# Patient Record
Sex: MALE | Race: BLACK OR AFRICAN AMERICAN | NOT HISPANIC OR LATINO | URBAN - METROPOLITAN AREA
[De-identification: names, ages, dates, MRNs, and addresses within clinical notes are randomized per-mention and may not be internally consistent; named-entity substitution may affect disease eponyms.]

---

## 2022-11-23 ENCOUNTER — EMERGENCY (EMERGENCY)
Facility: HOSPITAL | Age: 37
LOS: 1 days | Discharge: HOME | End: 2022-11-23
Admitting: EMERGENCY MEDICINE

## 2022-11-23 VITALS
HEIGHT: 76 IN | OXYGEN SATURATION: 98 % | HEART RATE: 93 BPM | RESPIRATION RATE: 16 BRPM | WEIGHT: 259.93 LBS | SYSTOLIC BLOOD PRESSURE: 146 MMHG | TEMPERATURE: 98 F | DIASTOLIC BLOOD PRESSURE: 92 MMHG

## 2022-11-23 PROCEDURE — 99284 EMERGENCY DEPT VISIT MOD MDM: CPT

## 2022-11-23 RX ORDER — TIZANIDINE 4 MG/1
2 TABLET ORAL
Qty: 42 | Refills: 0
Start: 2022-11-23 | End: 2022-11-29

## 2022-11-23 RX ORDER — CAPSAICIN 0.025 %
1 CREAM (GRAM) TOPICAL
Qty: 1 | Refills: 0
Start: 2022-11-23

## 2022-11-23 RX ORDER — LIDOCAINE 4 G/100G
1 CREAM TOPICAL ONCE
Refills: 0 | Status: COMPLETED | OUTPATIENT
Start: 2022-11-23 | End: 2022-11-23

## 2022-11-23 RX ORDER — KETOROLAC TROMETHAMINE 30 MG/ML
30 SYRINGE (ML) INJECTION ONCE
Refills: 0 | Status: DISCONTINUED | OUTPATIENT
Start: 2022-11-23 | End: 2022-11-23

## 2022-11-23 RX ORDER — DIAZEPAM 5 MG
5 TABLET ORAL ONCE
Refills: 0 | Status: DISCONTINUED | OUTPATIENT
Start: 2022-11-23 | End: 2022-11-23

## 2022-11-23 RX ORDER — IBUPROFEN 200 MG
1 TABLET ORAL
Qty: 28 | Refills: 0
Start: 2022-11-23 | End: 2022-11-29

## 2022-11-23 RX ADMIN — Medication 30 MILLIGRAM(S): at 21:31

## 2022-11-23 RX ADMIN — Medication 5 MILLIGRAM(S): at 21:32

## 2022-11-23 RX ADMIN — LIDOCAINE 1 PATCH: 4 CREAM TOPICAL at 21:32
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
## 2022-11-23 NOTE — ED PROVIDER NOTE - CARE PROVIDER_API CALL
Shalom Prescott)  Neurosurgery  [address], [place] Formerly [facility]  Phone: [phone]  Fax: [phone]  Follow Up Time:

## 2022-11-23 NOTE — ED PROVIDER NOTE - NS ED ROS FT
Review of Systems    Constitutional: (-) fever  Musculoskeletal: (-) neck pain, (-) joint pain  Integumentary: (-) rash, (-) edema  Neurological: (-) headache, (-) altered mental status  Heme/Lymph: (-) easy bruising (-) easy bleeding
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
## 2022-11-23 NOTE — ED PROVIDER NOTE - PHYSICAL EXAMINATION
Physical Exam    Vital Signs: I have reviewed the initial vital signs.  Constitutional: well-nourished, appears stated age, no acute distress  Cardiovascular: regular rate, regular rhythm, well-perfused extremities, DP pulse +2 and equal b/l  Musculoskeletal: +L low perilumbar ttp, worse with torso movement  Integumentary: warm, dry, no rash  Neurologic: LE, extremities’ motor and sensory functions grossly intact

## 2022-11-23 NOTE — ED PROVIDER NOTE - PATIENT PORTAL LINK FT
You can access the FollowMyHealth Patient Portal offered by [facility] by registering at the following website: [email or web address]. By joining [email or web address]’s FollowMyHealth portal, you will also be able to view your health information using other applications (apps) compatible with our system.

## 2022-11-23 NOTE — ED ADULT NURSE NOTE - OBJECTIVE STATEMENT
37 y/o M c/o lower back pain after basketball injury. NKA. A&Ox3. Per patient no PMH. No numbness or tingling to hands or feet. No medication taken following injury.
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
## 2022-11-25 DIAGNOSIS — M54.50 LOW BACK PAIN, UNSPECIFIED: ICD-10-CM
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
## 2023-03-27 NOTE — ED PROVIDER NOTE - NPI NUMBER (FOR [employer] USE ONLY) :
Department of Anesthesiology  Postprocedure Note    Patient: April Weeks  MRN: [id number]  YOB: 1981  Date of evaluation: 3/27/2023      Procedure Summary     Date: 03/27/23 Room / Location: [address]    Anesthesia Start: 1200 Anesthesia Stop: 8581    Procedure: EXCISION OF SCALP CYST TIMES TWO AND ONE MID BACK CYST (Head) Diagnosis:       Sebaceous cyst      (Sebaceous cyst [L72.3])    Surgeons: Lovely Kussmaul, MD Responsible Provider:     Anesthesia Type: MAC ASA Status: 3          Anesthesia Type: No value filed.     Alan Phase I: Alan Score: 10    Alan Phase II: Alan Score: 10      Anesthesia Post Evaluation    Patient location during evaluation: PACU  Patient participation: complete - patient participated  Level of consciousness: awake and alert  Pain score: 0  Airway patency: patent  Nausea & Vomiting: no nausea and no vomiting  Complications: no  Cardiovascular status: blood pressure returned to baseline and hemodynamically stable  Respiratory status: acceptable and room air  Hydration status: euvolemic
[[id number]]